# Patient Record
Sex: MALE | Race: ASIAN | NOT HISPANIC OR LATINO | URBAN - METROPOLITAN AREA
[De-identification: names, ages, dates, MRNs, and addresses within clinical notes are randomized per-mention and may not be internally consistent; named-entity substitution may affect disease eponyms.]

---

## 2024-10-08 ENCOUNTER — APPOINTMENT (OUTPATIENT)
Dept: PRIMARY CARE | Facility: CLINIC | Age: 34
End: 2024-10-08

## 2024-10-08 ENCOUNTER — LAB (OUTPATIENT)
Dept: LAB | Facility: LAB | Age: 34
End: 2024-10-08
Payer: COMMERCIAL

## 2024-10-08 VITALS
DIASTOLIC BLOOD PRESSURE: 84 MMHG | BODY MASS INDEX: 25.44 KG/M2 | HEIGHT: 64 IN | SYSTOLIC BLOOD PRESSURE: 131 MMHG | WEIGHT: 149 LBS | OXYGEN SATURATION: 98 % | HEART RATE: 76 BPM

## 2024-10-08 DIAGNOSIS — L70.0 ACNE VULGARIS: ICD-10-CM

## 2024-10-08 DIAGNOSIS — Z00.00 HEALTH CARE MAINTENANCE: ICD-10-CM

## 2024-10-08 DIAGNOSIS — Z23 NEEDS FLU SHOT: ICD-10-CM

## 2024-10-08 DIAGNOSIS — Z00.00 HEALTH CARE MAINTENANCE: Primary | ICD-10-CM

## 2024-10-08 LAB
ALBUMIN SERPL BCP-MCNC: 4.8 G/DL (ref 3.4–5)
ALP SERPL-CCNC: 97 U/L (ref 33–120)
ALT SERPL W P-5'-P-CCNC: 30 U/L (ref 10–52)
ANION GAP SERPL CALC-SCNC: 12 MMOL/L (ref 10–20)
AST SERPL W P-5'-P-CCNC: 23 U/L (ref 9–39)
BILIRUB SERPL-MCNC: 0.7 MG/DL (ref 0–1.2)
BUN SERPL-MCNC: 10 MG/DL (ref 6–23)
CALCIUM SERPL-MCNC: 10.1 MG/DL (ref 8.6–10.6)
CHLORIDE SERPL-SCNC: 102 MMOL/L (ref 98–107)
CHOLEST SERPL-MCNC: 244 MG/DL (ref 0–199)
CHOLESTEROL/HDL RATIO: 5.9
CO2 SERPL-SCNC: 29 MMOL/L (ref 21–32)
CREAT SERPL-MCNC: 0.86 MG/DL (ref 0.5–1.3)
EGFRCR SERPLBLD CKD-EPI 2021: >90 ML/MIN/1.73M*2
ERYTHROCYTE [DISTWIDTH] IN BLOOD BY AUTOMATED COUNT: 12.1 % (ref 11.5–14.5)
GLUCOSE SERPL-MCNC: 85 MG/DL (ref 74–99)
HCT VFR BLD AUTO: 43.9 % (ref 41–52)
HDLC SERPL-MCNC: 41.1 MG/DL
HGB BLD-MCNC: 14.6 G/DL (ref 13.5–17.5)
LDLC SERPL CALC-MCNC: 175 MG/DL
MCH RBC QN AUTO: 28.3 PG (ref 26–34)
MCHC RBC AUTO-ENTMCNC: 33.3 G/DL (ref 32–36)
MCV RBC AUTO: 85 FL (ref 80–100)
NON HDL CHOLESTEROL: 203 MG/DL (ref 0–149)
NRBC BLD-RTO: 0 /100 WBCS (ref 0–0)
PLATELET # BLD AUTO: 279 X10*3/UL (ref 150–450)
POTASSIUM SERPL-SCNC: 4.3 MMOL/L (ref 3.5–5.3)
PROT SERPL-MCNC: 8.3 G/DL (ref 6.4–8.2)
RBC # BLD AUTO: 5.16 X10*6/UL (ref 4.5–5.9)
SODIUM SERPL-SCNC: 139 MMOL/L (ref 136–145)
TRIGL SERPL-MCNC: 140 MG/DL (ref 0–149)
TSH SERPL-ACNC: 1.86 MIU/L (ref 0.44–3.98)
VLDL: 28 MG/DL (ref 0–40)
WBC # BLD AUTO: 6.9 X10*3/UL (ref 4.4–11.3)

## 2024-10-08 PROCEDURE — 36415 COLL VENOUS BLD VENIPUNCTURE: CPT

## 2024-10-08 PROCEDURE — 84443 ASSAY THYROID STIM HORMONE: CPT

## 2024-10-08 PROCEDURE — 90471 IMMUNIZATION ADMIN: CPT | Performed by: INTERNAL MEDICINE

## 2024-10-08 PROCEDURE — 99385 PREV VISIT NEW AGE 18-39: CPT | Performed by: INTERNAL MEDICINE

## 2024-10-08 PROCEDURE — 1036F TOBACCO NON-USER: CPT | Performed by: INTERNAL MEDICINE

## 2024-10-08 PROCEDURE — 90656 IIV3 VACC NO PRSV 0.5 ML IM: CPT | Performed by: INTERNAL MEDICINE

## 2024-10-08 PROCEDURE — 80061 LIPID PANEL: CPT

## 2024-10-08 PROCEDURE — 80053 COMPREHEN METABOLIC PANEL: CPT

## 2024-10-08 PROCEDURE — 85027 COMPLETE CBC AUTOMATED: CPT

## 2024-10-08 PROCEDURE — 3008F BODY MASS INDEX DOCD: CPT | Performed by: INTERNAL MEDICINE

## 2024-10-08 RX ORDER — BENZOYL PEROXIDE 5 G/100G
GEL TOPICAL 2 TIMES DAILY
Qty: 60 G | Refills: 2 | Status: SHIPPED | OUTPATIENT
Start: 2024-10-08 | End: 2025-02-05

## 2024-10-08 NOTE — PROGRESS NOTES
"Subjective   Patient ID: Brian Caal is a 34 y.o. male who presents for [facility].          HPI     Patient is a 34-year-old male with no significant past medical history presents to [facility].  He recently moved to [place] from [facility] in  2019.  Has never seen a physician since arrival.  He does report ongoing acne and he is interested in starting medications for it.  Does not get much exercise.  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  Wife does not work.  Originally from [facility].  No family locally.  No complaints at this time.  No recent lab test.  No family history of colon cancer or heart disease    Review of Systems  Constitutional: No fever or chills, No Night Sweats  Eyes: No Blurry Vision or Eye sight problems  ENT: No Nasal Discharge, Hoarseness, sore throat  Cardiovascular: no chest pain, no palpitations and no syncope.   Respiratory: no cough, no shortness of breath during exertion and no shortness of breath at rest.   Gastrointestinal: no abdominal pain, no nausea and no vomiting.   : No issues with urinary stream, burning with urination, no blood in urine or stools  Skin: No Skin rashes or Lesions  Neuro: No Headache, no dizziness or Numbness or tingling  Psych: No Anxiety, depression or sleeping problems  Heme: No Easy bleeding or brusing.     Objective   /84   Pulse 76   Ht 1.626 m (5' 4\")   Wt 67.6 kg (149 lb)   SpO2 98%   BMI 25.58 kg/m²     Physical Exam  Constitutional: Alert and in no acute distress. Well developed, well nourished.   Head and Face: Head and face: Normal.    Eyes: Normal external exam. Pupils were equal in size, round, reactive to light (PERRL) with normal accommodation and extraocular movements intact (EOMI).   Ears, Nose, Mouth, and Throat: External inspection of ears and nose: Normal.  Hearing: Normal.  Nasal mucosa, septum, and turbinates: Normal.  Lips, teeth, and gums: Normal.  Oropharynx: Normal.   Neck: No neck mass was observed. Supple. " "Thyroid not enlarged and there were no palpable thyroid nodules.   Cardiovascular: Heart rate and rhythm were normal, normal S1 and S2. Pedal pulses: Normal. No peripheral edema.   Pulmonary: No respiratory distress. Clear bilateral breath sounds.   Abdomen: Soft nontender; no abdominal mass palpated. Normal bowel sounds. No organomegaly.   : Deferred  Musculoskeletal: No joint swelling seen, normal movements of all extremities. Range of motion: Normal.  Muscle strength/tone: Normal.    Skin: Normal skin color and pigmentation, normal skin turgor, and no rash.   Neurologic: Deep tendon reflexes were 2+ and symmetric.   Psychiatric: Judgment and insight: Intact. Mood and affect: Normal.  Lymphatic: No cervical lymphadenopathy. Palpation of lymph nodes in axillae: Normal.  Palpation of lymph nodes in groin: Normal.    No results found for: \"WBC\", \"HGB\", \"HCT\", \"PLT\", \"CHOL\", \"TRIG\", \"HDL\", \"LDLDIRECT\", \"ALT\", \"AST\", \"NA\", \"K\", \"CL\", \"CREATININE\", \"BUN\", \"CO2\", \"TSH\", \"PSA\", \"INR\", \"GLUF\", \"HGBA1C\", \"ALBUR\"    No image results found.      Assessment/Plan   Problem List Items Addressed This Visit    None  Visit Diagnoses         Codes    [facility] maintenance    -  Primary Z00.00    Relevant Orders    Referral to Ophthalmology    CBC    Comprehensive metabolic panel    Lipid Panel    TSH with reflex to Free T4 if abnormal    Needs flu shot     Z23    Relevant Orders    Flu vaccine, trivalent, preservative free, age 6 months and greater (Fluarix/Fluzone/Flulaval) (Completed)    Acne vulgaris     L70.0    Relevant Medications    benzoyl peroxide 5 % gel              Dear Brian Caal     It was my pleasure to take care of you today in the office. Below are the things we discussed today:    1. Immunizations: Yearly Flu shot is recommended.         Flu vaccine today    2. Blood Work: Ordered  3. Seen your dentist twice a year  4. Yearly Eye exam is recommended    5. BMI: 25.6  6: Diet recommendations:   Eat Clean, Try to " have as many home cooked meals as possible  Avoid processed foods which contain excess calories, sugar, and sodium.    7. Exercise recommendations:   150 minutes a week to maintain your weight     If you have to loose weight, you need a better diet and exercise plan.     8. Please get your Living will / Advance directive completed if you do not have one already. Please make sure our office has a copy of the latest one.     9. Colonoscopy: Start at age 45  10. PSA: Start at age 45    11.  Follow-up annually or as needed    Follow up in one year for a Physical. Please call the office before your Physical to see if you need blood work completed prior to your physical.     Please call me if any questions arise from now until your next visit. I will call you after I am done seeing patients. A Doctor is always available by phone when the office is closed. Please feel free to call for help with any problem that you feel shouldn't wait until the office re-opens.     Isaac Scott, DO

## 2024-10-16 ENCOUNTER — OFFICE VISIT (OUTPATIENT)
Dept: OPHTHALMOLOGY | Facility: CLINIC | Age: 34
End: 2024-10-16
Payer: COMMERCIAL

## 2024-10-16 DIAGNOSIS — H52.13 MYOPIA OF BOTH EYES: ICD-10-CM

## 2024-10-16 DIAGNOSIS — H52.223 REGULAR ASTIGMATISM OF BOTH EYES: ICD-10-CM

## 2024-10-16 DIAGNOSIS — H53.143 ASTHENOPIA OF BOTH EYES: Primary | ICD-10-CM

## 2024-10-16 PROCEDURE — 92015 DETERMINE REFRACTIVE STATE: CPT | Performed by: OPTOMETRIST

## 2024-10-16 PROCEDURE — 99204 OFFICE O/P NEW MOD 45 MIN: CPT | Performed by: OPTOMETRIST

## 2024-10-16 ASSESSMENT — REFRACTION
OD_AXIS: 075
OS_CYLINDER: -1.50
OD_CYLINDER: -1.50
OS_AXIS: 095
OD_SPHERE: -3.25
OS_SPHERE: -1.75

## 2024-10-16 ASSESSMENT — TONOMETRY
OD_IOP_MMHG: 16
OS_IOP_MMHG: 16
IOP_METHOD: GOLDMANN APPLANATION

## 2024-10-16 ASSESSMENT — CONF VISUAL FIELD
METHOD: COUNTING FINGERS
OD_INFERIOR_TEMPORAL_RESTRICTION: 0
OS_SUPERIOR_NASAL_RESTRICTION: 0
OS_INFERIOR_TEMPORAL_RESTRICTION: 0
OD_SUPERIOR_NASAL_RESTRICTION: 0
OS_NORMAL: 1
OS_SUPERIOR_TEMPORAL_RESTRICTION: 0
OS_INFERIOR_NASAL_RESTRICTION: 0
OD_NORMAL: 1
OD_INFERIOR_NASAL_RESTRICTION: 0
OD_SUPERIOR_TEMPORAL_RESTRICTION: 0

## 2024-10-16 ASSESSMENT — REFRACTION_MANIFEST
OS_CYLINDER: -1.50
OD_SPHERE: -3.75
OD_AXIS: 075
OD_CYLINDER: -1.50
OS_AXIS: 095
OS_SPHERE: -2.50

## 2024-10-16 ASSESSMENT — EXTERNAL EXAM - LEFT EYE: OS_EXAM: NORMAL

## 2024-10-16 ASSESSMENT — ENCOUNTER SYMPTOMS
MUSCULOSKELETAL NEGATIVE: 0
NEUROLOGICAL NEGATIVE: 0
CONSTITUTIONAL NEGATIVE: 0
PSYCHIATRIC NEGATIVE: 0
ALLERGIC/IMMUNOLOGIC NEGATIVE: 0
EYES NEGATIVE: 0
ENDOCRINE NEGATIVE: 0
HEMATOLOGIC/LYMPHATIC NEGATIVE: 0
GASTROINTESTINAL NEGATIVE: 0
CARDIOVASCULAR NEGATIVE: 0
RESPIRATORY NEGATIVE: 0

## 2024-10-16 ASSESSMENT — REFRACTION_WEARINGRX
OD_CYLINDER: -0.75
OD_AXIS: 070
OS_AXIS: 095
OS_CYLINDER: -0.75
OS_SPHERE: -2.50
OD_SPHERE: -4.25

## 2024-10-16 ASSESSMENT — VISUAL ACUITY
OD_CC+: +1
OS_CC: 20/20
OD_CC: 20/25
CORRECTION_TYPE: GLASSES
OS_CC+: -3
METHOD: SNELLEN - LINEAR

## 2024-10-16 ASSESSMENT — EXTERNAL EXAM - RIGHT EYE: OD_EXAM: NORMAL

## 2024-10-16 ASSESSMENT — CUP TO DISC RATIO
OS_RATIO: 0.3X0.4
OD_RATIO: 0.3X0.4

## 2024-10-16 ASSESSMENT — SLIT LAMP EXAM - LIDS
COMMENTS: NORMAL
COMMENTS: NORMAL

## 2024-10-16 NOTE — PROGRESS NOTES
Assessment/Plan   Diagnoses and all orders for this visit:  Asthenopia of both eyes  Myopia of both eyes  Regular astigmatism of both eyes  New spec rx released today per patient request. Ocular health wnl for age OU. Monitor 1 year or sooner prn. Refraction billed today. Pt consents to receiving glasses Rx today. Patient's/guardian's signature obtained to acknowledge and confirm that a paper copy of glasses Rx was given to patient in compliance with [facility] Eyeglass Rule. Electronic copy of Rx will also be available via [employer].   Discussed overminus and undercorrection of cylinder (cyl) likely contributing to asthenopia pattie w/ extended near work.   Binocular blur balanced refraction today to ensure no excess overminus.

## 2025-10-01 ENCOUNTER — APPOINTMENT (OUTPATIENT)
Dept: PRIMARY CARE | Facility: CLINIC | Age: 35
End: 2025-10-01
Payer: COMMERCIAL

## 2025-10-28 ENCOUNTER — APPOINTMENT (OUTPATIENT)
Dept: OPHTHALMOLOGY | Facility: CLINIC | Age: 35
End: 2025-10-28
Payer: COMMERCIAL